# Patient Record
Sex: MALE | Race: WHITE | NOT HISPANIC OR LATINO | Employment: FULL TIME | URBAN - METROPOLITAN AREA
[De-identification: names, ages, dates, MRNs, and addresses within clinical notes are randomized per-mention and may not be internally consistent; named-entity substitution may affect disease eponyms.]

---

## 2021-04-05 ENCOUNTER — OCCUPATIONAL HEALTH (OUTPATIENT)
Dept: URGENT CARE | Facility: CLINIC | Age: 31
End: 2021-04-05

## 2021-04-05 DIAGNOSIS — Z91.89 AT INCREASED RISK OF EXPOSURE TO COVID-19 VIRUS: Primary | ICD-10-CM

## 2021-04-05 LAB
CTP QC/QA: YES
SARS-COV-2 RDRP RESP QL NAA+PROBE: NEGATIVE

## 2021-04-05 PROCEDURE — U0002: ICD-10-PCS | Mod: QW,S$GLB,, | Performed by: FAMILY MEDICINE

## 2021-04-05 PROCEDURE — U0002 COVID-19 LAB TEST NON-CDC: HCPCS | Mod: QW,S$GLB,, | Performed by: FAMILY MEDICINE

## 2021-04-28 ENCOUNTER — OCCUPATIONAL HEALTH (OUTPATIENT)
Dept: URGENT CARE | Facility: CLINIC | Age: 31
End: 2021-04-28

## 2021-04-28 DIAGNOSIS — Z01.89 ENCOUNTER FOR LABORATORY TEST: Primary | ICD-10-CM

## 2021-04-28 LAB
CTP QC/QA: YES
SARS-COV-2 RDRP RESP QL NAA+PROBE: NEGATIVE

## 2021-04-28 PROCEDURE — U0002: ICD-10-PCS | Mod: QW,S$GLB,, | Performed by: FAMILY MEDICINE

## 2021-04-28 PROCEDURE — U0002 COVID-19 LAB TEST NON-CDC: HCPCS | Mod: QW,S$GLB,, | Performed by: FAMILY MEDICINE

## 2021-05-25 ENCOUNTER — OCCUPATIONAL HEALTH (OUTPATIENT)
Dept: URGENT CARE | Facility: CLINIC | Age: 31
End: 2021-05-25

## 2021-05-25 DIAGNOSIS — Z11.52 ENCOUNTER FOR SCREENING LABORATORY TESTING FOR COVID-19 VIRUS: Primary | ICD-10-CM

## 2021-05-25 LAB
CTP QC/QA: YES
SARS-COV-2 RDRP RESP QL NAA+PROBE: NEGATIVE

## 2021-05-25 PROCEDURE — U0002: ICD-10-PCS | Mod: QW,S$GLB,, | Performed by: FAMILY MEDICINE

## 2021-05-25 PROCEDURE — U0002 COVID-19 LAB TEST NON-CDC: HCPCS | Mod: QW,S$GLB,, | Performed by: FAMILY MEDICINE

## 2024-07-15 ENCOUNTER — TELEPHONE (OUTPATIENT)
Dept: UROLOGY | Facility: CLINIC | Age: 34
End: 2024-07-15
Payer: COMMERCIAL

## 2024-07-15 NOTE — TELEPHONE ENCOUNTER
----- Message from Yvonne Chappell sent at 7/15/2024  9:31 AM CDT -----  Contact: self  Type: Needs Medical Advice  Who Called:  the patient     Best Call Back Number: [phone]  Additional Information: pt calling to set up vasectomy appt. Please call

## 2024-08-02 ENCOUNTER — OFFICE VISIT (OUTPATIENT)
Dept: UROLOGY | Facility: CLINIC | Age: 34
End: 2024-08-02
Payer: COMMERCIAL

## 2024-08-02 VITALS — WEIGHT: 195 LBS

## 2024-08-02 DIAGNOSIS — Z30.09 VASECTOMY EVALUATION: Primary | ICD-10-CM

## 2024-08-02 PROCEDURE — 99999 PR PBB SHADOW E&M-EST. PATIENT-LVL II: CPT | Mod: PBBFAC,,, | Performed by: UROLOGY

## 2024-08-02 NOTE — PROGRESS NOTES
[facility] New Patient/H&P:    Gildardo Byrne is a 33 y.o. male who presents for vasectomy evaluation.    Patient is engaged with 5 biological kids - ages 18, 15, 8, 6 and 4 days old. Does not want more children.     He understands that this is a permanent procedure.     Does electrical work offshore. Never smoked.     Denies any fever, chills, gross hematuria, flank pain, bone pain, unintentional weight loss,  trauma or history of  malignancy].       History reviewed. No pertinent past medical history.    History reviewed. No pertinent surgical history.    No family history on file.    Review of patient's allergies indicates:  No Known Allergies    Medications Reviewed: see MAR      FOCUSED PHYSICAL EXAM:    There were no vitals filed for this visit.  There is no height or weight on file to calculate BMI. Weight: 88.5 kg (195 lb)         General: Alert, cooperative, no distress, appears stated age  Abdomen: Soft, non-tender, no CVA tenderness, non-distended  : Circumcised, normal phallus without plaques/lesions, bilaterally descended testicles without lesions, bilateral vas palpated    LABS:    No results found for this or any previous visit (from the past 336 hour(s)).        Assessment/Diagnosis:    1. Vasectomy evaluation  Sperm Count, Post Vasectomy          Plans:    - I spent 30 minutes of the day of this encounter preparing for, treating and managing this patient. Extensive discussion with patient regarding the risks, benefits and alternatives to bilateral vasectomy. Patient verbalizes understanding and wishes to proceed. Explained that this is a permanent procedure and will result in infertility.   - Scheduled for bilateral vasectomy in clinic next available. All aura-procedural instructions given to patient. Instructed to call the office should he have any additional questions prior to his procedure.

## 2024-09-26 ENCOUNTER — PROCEDURE VISIT (OUTPATIENT)
Dept: UROLOGY | Facility: CLINIC | Age: 34
End: 2024-09-26
Payer: COMMERCIAL

## 2024-09-26 DIAGNOSIS — Z30.09 VASECTOMY EVALUATION: ICD-10-CM

## 2024-09-26 DIAGNOSIS — Z30.2 ENCOUNTER FOR STERILIZATION: Primary | ICD-10-CM

## 2024-09-26 PROCEDURE — 55250 REMOVAL OF SPERM DUCT(S): CPT | Mod: S$GLB,,, | Performed by: UROLOGY

## 2024-09-26 RX ORDER — TRAMADOL HYDROCHLORIDE 50 MG/1
50 TABLET ORAL EVERY 6 HOURS PRN
Qty: 7 TABLET | Refills: 0 | Status: SHIPPED | OUTPATIENT
Start: 2024-09-26 | End: 2024-09-29

## 2024-09-26 RX ORDER — CEPHALEXIN 500 MG/1
500 CAPSULE ORAL EVERY 8 HOURS
Qty: 9 CAPSULE | Refills: 0 | Status: SHIPPED | OUTPATIENT
Start: 2024-09-26 | End: 2024-09-29

## 2024-09-26 NOTE — PROCEDURES
VASECTOMY Procedure/Discharge Note    Surgery Date:  9/26/2024    NAME:Gildardo Head    SURGEON: Dr. Rony Donaldson    ASSISTANT: None    DIAGNOSIS: Desired sterilization.    OPERATION: Bilateral vastectomy.    TECHNIQUE: The patient was induced with local anesthesia. 2 incisions were then made overlying vas in the upper lateral scrotum. Ring clamp used to isolate vas deferens and perivasal tissue dissected free with sharp hemostat and metzenbaum scissors until only vas deferens isolated by ring forceps. Vasculature inferior dissected free and an approximate 1 inch segment was isolated with 2 hemostats, and transected with bovie electrocautery which was used as well to cauterize the stump lumen. 2-0 chromic stick tie used to suture ligate the stump. Assessed for bleeding, spot cautery used, and noted to be hemostatic. Skin incisions closed with 4-0 Monocryl horizontal mattress suture x2.    ANESTHESIA: Lidocaine 1%  FINDINGS: Normal vas.  COMPLICATIONS: None  PRECAUTION DISCUSSED: Rest, ice pack, no ejaculation for 3 days, activity restrictions, and protected intercourse until 1 negative semenalysis.    POST OP MEDICATIONS:  Keflex 500 mg p.o. t.i.d. and Tramadol 50mg Q6 hours prn pain.  RTC PRN.     Disposition: Home    Discharge home today status post uncomplicated procedure as above  Diet - resume home diet  Follow up: RTC PRN. Patient given specimen cups preoperatively. Instructed to return to lab in 6 and 8 weeks for post-vasectomy semen analysis.   Instructions: Rest, ice pack, no ejaculation for 3 days, activity restrictions, and protected intercourse until 1 negative semenalysis.  Meds:     Medication List            Accurate as of September 26, 2024  9:04 AM. If you have any questions, ask your nurse or doctor.                START taking these medications      cephALEXin 500 MG capsule  Commonly known as: KEFLEX  Take 1 capsule (500 mg total) by mouth every 8 (eight) hours. for 3 days  Started by: Rony DEGROOT  Mendoza Hernandez MD     traMADoL 50 mg tablet  Commonly known as: ULTRAM  Take 1 tablet (50 mg total) by mouth every 6 (six) hours as needed for Pain.  Started by: Rony Donaldson Jr, MD               Where to Get Your Medications        These medications were sent to [facility] [address] [address]      Phone: [phone]   cephALEXin 500 MG capsule  traMADoL 50 mg tablet         Rony Donaldson Jr, MD

## 2025-02-06 ENCOUNTER — TELEPHONE (OUTPATIENT)
Dept: UROLOGY | Facility: CLINIC | Age: 35
End: 2025-02-06
Payer: COMMERCIAL

## 2025-02-06 NOTE — TELEPHONE ENCOUNTER
----- Message from Tami sent at 2/6/2025 11:08 AM CST -----  Contact: PT  Type: Needs Medical Advice    Who Called: PT  Best Call Back Number: [phone]  Additional  Information: Requesting call back would like to speak with a nurse about some questions he had about procedure one last year in  Sept.  Please Advise- Thank you